# Patient Record
Sex: MALE | NOT HISPANIC OR LATINO | Employment: FULL TIME | URBAN - METROPOLITAN AREA
[De-identification: names, ages, dates, MRNs, and addresses within clinical notes are randomized per-mention and may not be internally consistent; named-entity substitution may affect disease eponyms.]

---

## 2021-10-21 ENCOUNTER — OFFICE VISIT (OUTPATIENT)
Dept: PODIATRY | Facility: CLINIC | Age: 51
End: 2021-10-21

## 2021-10-21 VITALS
SYSTOLIC BLOOD PRESSURE: 128 MMHG | OXYGEN SATURATION: 100 % | HEIGHT: 71 IN | WEIGHT: 157 LBS | BODY MASS INDEX: 21.98 KG/M2 | DIASTOLIC BLOOD PRESSURE: 76 MMHG | TEMPERATURE: 97.5 F | HEART RATE: 80 BPM

## 2021-10-21 DIAGNOSIS — M20.11 HALLUX VALGUS OF RIGHT FOOT: ICD-10-CM

## 2021-10-21 DIAGNOSIS — M79.671 FOOT PAIN, RIGHT: Primary | ICD-10-CM

## 2021-10-21 PROCEDURE — 99203 OFFICE O/P NEW LOW 30 MIN: CPT | Performed by: PODIATRIST

## 2021-10-21 RX ORDER — FLUTICASONE PROPIONATE 50 MCG
2 SPRAY, SUSPENSION (ML) NASAL DAILY
COMMUNITY

## 2021-10-21 RX ORDER — GUAIFENESIN 600 MG/1
1200 TABLET, EXTENDED RELEASE ORAL AS NEEDED
COMMUNITY

## 2021-10-21 RX ORDER — PANTOPRAZOLE SODIUM 20 MG/1
20 TABLET, DELAYED RELEASE ORAL AS NEEDED
COMMUNITY

## 2021-10-21 NOTE — PROGRESS NOTES
[facility] - PODIATRY    Today's Date: 10/21/21    Patient Name: Tomi Granger  MRN: [id number]  CSN: [id number]  PCP: Elbert Pierson MD  Referring Provider: Elbert Pierson MD    SUBJECTIVE     Chief Complaint   Patient presents with   • Left Foot - Bunions, Establish Care   • Right Foot - Bunions, Establish Care, Pain     HPI: Tomi Granger, a 51 y.o.male, presents to clinic.    New, Established, New Problem: New    Location: First MPJ    Duration: First noticed these issues in his early 20s    Onset: Insidious    Nature: Sore    Stable, worsening, improving: Stable    Aggravating factors:  Patient relates pain is aggravated by shoe gear and ambulation.      Previous Treatment: Change in shoe gear, change in activities    Patient denies any fevers, chills, nausea, vomiting, shortness of breath, nor any other constitutional signs nor symptoms.    No other pedal complaints at this time.    Recent medical changes:  none    Past Medical History:   Diagnosis Date   • Acid reflux    • Allergies    • Ankle fracture, right    • Bunion    • Foot fracture, right 2011     Past Surgical History:   Procedure Laterality Date   • ANKLE OPEN REDUCTION INTERNAL FIXATION      2011   • FOOT SURGERY      right, 2011   • WISDOM TOOTH EXTRACTION       Family History   Problem Relation Age of Onset   • Heart disease Mother    • Cancer Mother         breast, lymphoma   • Heart disease Father    • Heart disease Maternal Grandmother    • Cancer Maternal Grandmother         colon   • Diabetes Neg Hx    • Stroke Neg Hx      Social History     Socioeconomic History   • Marital status:    Tobacco Use   • Smoking status: Never Smoker   • Smokeless tobacco: Never Used   Vaping Use   • Vaping Use: Never used   Substance and Sexual Activity   • Alcohol use: Never   • Drug use: Never   • Sexual activity: Defer     Allergies   Allergen Reactions   • Aspirin Palpitations     Current Outpatient Medications    Medication Sig Dispense Refill   • fluticasone (FLONASE) 50 MCG/ACT nasal spray 2 sprays into the nostril(s) as directed by provider Daily.     • guaiFENesin (MUCINEX) 600 MG 12 hr tablet Take 1,200 mg by mouth As Needed for Cough.     • pantoprazole (PROTONIX) 20 MG EC tablet Take 20 mg by mouth As Needed for Heartburn.       No current facility-administered medications for this visit.     Review of Systems   Constitutional: Negative.    Musculoskeletal:        Right 1st MPJ   All other systems reviewed and are negative.      OBJECTIVE     Vitals:    10/21/21 0811   BP: 128/76   Pulse: 80   Temp: 97.5 °F (36.4 °C)   SpO2: 100%       Patient seen in no apparent distress.      PHYSICAL EXAM:     Foot/Ankle Exam:       General:   Appearance: appears stated age and healthy    Orientation: AAOx3    Affect: appropriate    Gait: unimpaired    Shoe Gear:  Casual shoes    VASCULAR      Right Foot Vascularity   Normal vascular exam    Dorsalis pedis:  2+  Posterior tibial:  2+  Skin Temperature: warm    Edema Grading:  None  CFT:  < 3 seconds  Pedal Hair Growth:  Present  Varicosities: none       Left Foot Vascularity   Normal vascular exam    Dorsalis pedis:  2+  Posterior tibial:  2+  Skin Temperature: warm    Edema Grading:  None  CFT:  < 3 seconds  Pedal Hair Growth:  Present  Varicosities: none        NEUROLOGIC     Right Foot Neurologic   Normal sensation    Light touch sensation:  Normal  Vibratory sensation:  Normal  Hot/Cold sensation: normal    Protective Sensation using [place] Monofilament:  10     Left Foot Neurologic   Normal sensation    Light touch sensation:  Normal  Vibratory sensation:  Normal  Hot/cold sensation: normal    Protective Sensation using [place] Monofilament:  10     MUSCULOSKELETAL      Right Foot Musculoskeletal   Hallux valgus: Yes       MUSCLE STRENGTH     Right Foot Muscle Strength   Normal strength    Foot dorsiflexion:  5  Foot plantar flexion:  5  Foot inversion:   5  Foot eversion:  5     Left Foot Muscle Strength   Foot dorsiflexion:  5  Foot plantar flexion:  5  Foot inversion:  5  Foot eversion:  5     RANGE OF MOTION      Right Foot Range of Motion   Foot and ankle ROM within normal limits       Left Foot Range of Motion   Foot and ankle ROM within normal limits       DERMATOLOGIC     Right Foot Dermatologic   Skin: skin intact    Nails: normal       Left Foot Dermatologic   Skin: skin intact    Nails: normal        RADIOLOGY:      IN-OFFICE IMAGING:      3 view, AP, MO, Lateral, right first MPJ pain  Indication: Bunion pain  Findings: Medial deviation of the first metatarsal with associated lateral deviation of the hallux at the metatarsal phalangeal joint.  No periosteal reactions nor osteolytic changes seen.  No occult fractures seen.  Comparison: No comparison views available      ASSESSMENT/PLAN     Diagnoses and all orders for this visit:    1. Foot pain, right (Primary)    2. Hallux valgus of right foot    Comprehensive lower extremity examination and evaluation was performed.    Discussed findings and treatment plan including risks, benefits, and treatment options with patient in detail. Patient agreed with treatment plan.    Planned surgery: Distal right first MPJ bunionectomy.  Upon discussion of surgical correction, post-operative requirements along with risk and benefits of the surgery, the patient states they do not want to pursue surgery at this time.      Dr. Liu recommended purchase and use of OTC Spenco Orthotics.  The patient states understanding and agreement with this plan.    Discussed proper shoegear for the patient's feet and medical condition.  Patient states understanding and agreement with this plan.    An After Visit Summary was printed and given to the patient at discharge, including (if requested) any available informative/educational handouts regarding diagnosis, treatment, or medications. All questions were answered to patient/family  satisfaction. Should symptoms fail to improve or worsen they agree to call or return to clinic or to go to the Emergency Department. Discussed the importance of following up with any needed screening tests/labs/specialist appointments and any requested follow-up recommended by me today. Importance of maintaining follow-up discussed and patient accepts that missed appointments can delay diagnosis and potentially lead to worsening of conditions.    Return if symptoms worsen or fail to improve., or sooner if acute issues arise.    This document has been electronically signed by Farzad Liu DPM on October 21, 2021 08:58 EDT

## 2021-11-15 ENCOUNTER — TELEPHONE (OUTPATIENT)
Dept: GASTROENTEROLOGY | Facility: CLINIC | Age: 51
End: 2021-11-15

## 2021-11-15 NOTE — TELEPHONE ENCOUNTER
CALLED PT 4 TIMES TO DO PHONE SCREENING CALL. PT DOESN'T HAVE A VM SET UP ON THE PHONE COULD NOT LEAVE HIM A MESSAGE. CALLED 2 TIMES AROUND APPOINTMENT TIME AND 3 TIME THIS AFTERNOON. WILL NO SHOW THE PT FOR KEVIN. AND WAIT FOR A CALL BACK.

## 2023-03-10 ENCOUNTER — PREP FOR SURGERY (OUTPATIENT)
Dept: OTHER | Facility: HOSPITAL | Age: 53
End: 2023-03-10
Payer: COMMERCIAL

## 2023-03-10 ENCOUNTER — CLINICAL SUPPORT (OUTPATIENT)
Dept: GASTROENTEROLOGY | Facility: CLINIC | Age: 53
End: 2023-03-10
Payer: COMMERCIAL

## 2023-03-10 DIAGNOSIS — Z80.0 FAMILY HISTORY OF COLON CANCER: ICD-10-CM

## 2023-03-10 DIAGNOSIS — Z12.11 COLON CANCER SCREENING: Primary | ICD-10-CM

## 2023-03-10 DIAGNOSIS — K21.9 GASTROESOPHAGEAL REFLUX DISEASE, UNSPECIFIED WHETHER ESOPHAGITIS PRESENT: ICD-10-CM

## 2023-03-10 RX ORDER — SODIUM PICOSULFATE, MAGNESIUM OXIDE, AND ANHYDROUS CITRIC ACID 10; 3.5; 12 MG/160ML; G/160ML; G/160ML
160 LIQUID ORAL TAKE AS DIRECTED
Qty: 320 ML | Refills: 0 | Status: SHIPPED | OUTPATIENT
Start: 2023-03-10

## 2023-03-10 NOTE — PROGRESS NOTES
Tomi Granger  1970  52 y.o.    Reason for call: Screening Colonoscopy and GERD  Prep prescribed: Clenpiq  Prep instructions reviewed with patient and sent to patient via [facility]  Clearance needed? No  If yes, what clearance is needed? N/A  Clearance has been requested from N/A  The patient has been scheduled for: EGD & Colonoscopy  Family history of colon cancer? Yes  If yes, indicate relative: Maternal Grandmother  Family History   Problem Relation Age of Onset   • Heart disease Mother    • Cancer Mother         breast, lymphoma   • Heart disease Father    • Colon cancer Maternal Grandmother    • Heart disease Maternal Grandmother    • Cancer Maternal Grandmother         colon   • Diabetes Neg Hx    • Stroke Neg Hx      Past Medical History:   Diagnosis Date   • Acid reflux    • Allergies    • Ankle fracture, right    • Bunion    • Foot fracture, right 2011     Allergies   Allergen Reactions   • Aspirin Palpitations     Past Surgical History:   Procedure Laterality Date   • ANKLE OPEN REDUCTION INTERNAL FIXATION      2011   • FOOT SURGERY      right, 2011   • UPPER GASTROINTESTINAL ENDOSCOPY     • WISDOM TOOTH EXTRACTION       Social History     Socioeconomic History   • Marital status:    Tobacco Use   • Smoking status: Never     Passive exposure: Never   • Smokeless tobacco: Never   Vaping Use   • Vaping Use: Never used   Substance and Sexual Activity   • Alcohol use: Never   • Drug use: Never   • Sexual activity: Defer       Current Outpatient Medications:   •  fluticasone (FLONASE) 50 MCG/ACT nasal spray, 2 sprays into the nostril(s) as directed by provider Daily., Disp: , Rfl:   •  guaiFENesin (MUCINEX) 600 MG 12 hr tablet, Take 2 tablets by mouth As Needed for Cough., Disp: , Rfl:   •  pantoprazole (PROTONIX) 20 MG EC tablet, Take 1 tablet by mouth As Needed for Heartburn., Disp: , Rfl:   Answers for HPI/ROS submitted by the patient on 3/5/2023  What is the primary reason for your visit?:  Other  Please describe your symptoms.: Primary doctor wants me to have a colonoscopy. , , I want to have my stomach scanned as well because I have similar symptoms of reguritating gastric juices when eating some foods. My primary doctor gave me meds but I had the entry of my stomach expanded before and I suspect it needs it again because my symptoms are similar.  Have you had these symptoms before?: Yes  How long have you been having these symptoms?: Greater than 2 weeks  Please list any medications you are currently taking for this condition.: Antacids, Pantoprazole. Seldom take.  Please describe any probable cause for these symptoms. : Stated above

## 2023-09-18 ENCOUNTER — OFFICE VISIT (OUTPATIENT)
Dept: GASTROENTEROLOGY | Facility: CLINIC | Age: 53
End: 2023-09-18
Payer: COMMERCIAL

## 2023-09-18 VITALS
HEIGHT: 71 IN | SYSTOLIC BLOOD PRESSURE: 124 MMHG | BODY MASS INDEX: 21.42 KG/M2 | HEART RATE: 68 BPM | WEIGHT: 153 LBS | DIASTOLIC BLOOD PRESSURE: 73 MMHG | OXYGEN SATURATION: 100 %

## 2023-09-18 DIAGNOSIS — Z80.0 FAMILY HISTORY OF COLON CANCER: Primary | ICD-10-CM

## 2023-09-18 DIAGNOSIS — Z86.010 PERSONAL HISTORY OF COLONIC POLYPS: ICD-10-CM

## 2023-09-18 DIAGNOSIS — K21.9 GASTROESOPHAGEAL REFLUX DISEASE, UNSPECIFIED WHETHER ESOPHAGITIS PRESENT: ICD-10-CM

## 2023-09-18 DIAGNOSIS — K20.0 EOSINOPHILIC ESOPHAGITIS: ICD-10-CM

## 2023-09-18 PROCEDURE — 99214 OFFICE O/P EST MOD 30 MIN: CPT | Performed by: NURSE PRACTITIONER

## 2023-09-18 NOTE — PROGRESS NOTES
Chief Complaint   2m endo follow up    History of Present Illness       Tomi Granger is a 52 y.o. male who presents to [facility] for follow-up after recent EGD and colonoscopy.  We have reviewed endoscopy in office.  On patient's recent EGD he had esophageal dilation which has improved his dysphagia.  He reports previous esophageal dilation about 20 years ago.  Patient's biopsies consistent with eosinophilic esophagitis.  Patient continues on Protonix and has seen relief in his reflux as well as his dysphagia.  Patient reports normal bowel movements.  Patient has cut out soy, wheat, eggs and dairy products to see if this improves his EOE.  Patient also struggles with allergies and dry skin.  Patient denies fever, nausea, vomiting, weight loss, night sweats, melena, hematochezia, hematemesis.    Endoscopy: Review of the patient's most recent EGD and colonoscopy performed by Dr. Watt on 07.17.2023 10 mm polyp in the descending colon, 8 mm polyp in the sigmoid colon tubular adenoma repeat 3 years.  EGD with eosinophilic esophagitis dilated to 18 mm.  Continue Protonix.      Results       Result Review :   The following data was reviewed by: INES Ying on 09/18/2023:          Iron Profile No results found for: IRON, TIBC, LABIRON, TRANSFERRIN  Ferritin No results found for: FERRITIN            Past Medical History       Past Medical History:   Diagnosis Date    Acid reflux     Allergies     Ankle fracture, right     Bunion     Foot fracture, right 2011       Past Surgical History:   Procedure Laterality Date    ANKLE OPEN REDUCTION INTERNAL FIXATION Right     2011    COLONOSCOPY N/A 07/17/2023    Procedure: COLONOSCOPY WITH HOT SNARE POLYPECTOMIES, CLIP APPLICATION X1;  Surgeon: Isaiah Watt MD;  Location: [facility] ENDOSCOPY;  Service: Gastroenterology;  Laterality: N/A;  COLON POLYPS    ENDOSCOPY N/A 07/17/2023    Procedure: ESOPHAGOGASTRODUODENOSCOPY WITH  "BIOPSIES AND 15-18MM BALLOON DILATATION;  Surgeon: Isaiah Watt MD;  Location: [facility] ENDOSCOPY;  Service: Gastroenterology;  Laterality: N/A;  ESOPHAGEAL STRICTURE    FOOT SURGERY      right, 2011    UPPER GASTROINTESTINAL ENDOSCOPY      WISDOM TOOTH EXTRACTION           Current Outpatient Medications:     fluticasone (FLONASE) 50 MCG/ACT nasal spray, 2 sprays into the nostril(s) as directed by provider Daily., Disp: , Rfl:     pantoprazole (PROTONIX) 40 MG EC tablet, Take 1 tablet by mouth Daily., Disp: 30 tablet, Rfl: 5    pantoprazole (PROTONIX) 20 MG EC tablet, Take 1 tablet by mouth As Needed for Heartburn. (Patient not taking: Reported on 9/18/2023), Disp: , Rfl:      Allergies   Allergen Reactions    Aspirin Palpitations       Family History   Problem Relation Age of Onset    Heart disease Mother     Cancer Mother         breast, lymphoma    Heart disease Father     Colon cancer Maternal Grandmother     Heart disease Maternal Grandmother     Cancer Maternal Grandmother         colon    Diabetes Neg Hx     Stroke Neg Hx         Social History     Social History Narrative    Not on file       Objective     Vital Signs:   /73 (BP Location: Left arm, Patient Position: Sitting, Cuff Size: Small Adult)   Pulse 68   Ht 180.3 cm (71\")   Wt 69.4 kg (153 lb)   SpO2 100%   BMI 21.34 kg/m²       Physical Exam  Constitutional:       General: He is not in acute distress.     Appearance: Normal appearance. He is well-developed and normal weight.   Eyes:      Conjunctiva/sclera: Conjunctivae normal.      Pupils: Pupils are equal, round, and reactive to light.      Visual Fields: Right eye visual fields normal and left eye visual fields normal.   Cardiovascular:      Rate and Rhythm: Normal rate and regular rhythm.      Heart sounds: Normal heart sounds.   Pulmonary:      Effort: Pulmonary effort is normal. No retractions.      Breath sounds: Normal breath sounds and air entry.      Comments: Inspection " of chest: normal appearance  Abdominal:      General: Bowel sounds are normal.      Palpations: Abdomen is soft.      Tenderness: There is no abdominal tenderness.      Comments: No appreciable hepatosplenomegaly   Musculoskeletal:      Cervical back: Neck supple.      Right lower leg: No edema.      Left lower leg: No edema.   Lymphadenopathy:      Cervical: No cervical adenopathy.   Skin:     Findings: No lesion.      Comments: Turgor normal   Neurological:      Mental Status: He is alert and oriented to person, place, and time.   Psychiatric:         Mood and Affect: Mood and affect normal.         Assessment & Plan          Assessment and Plan    Diagnoses and all orders for this visit:    1. Family history of colon cancer (Primary)    2. Gastroesophageal reflux disease, unspecified whether esophagitis present    3. Eosinophilic esophagitis    4. Personal history of colonic polyps      52-year-old male presenting the office today for follow-up after recent EGD and colonoscopy.  We have reviewed endoscopy in office.  On patient's recent EGD he had esophageal dilation which has improved his dysphagia.  Patient will continue Protonix due to EOE.  We have discussed Dupixent as a possible treatment option.  Patient be due for repeat colonoscopy in 3 years, 2026.  Patient agreeable to this plan will follow-up annually.          Follow Up       Follow Up   Return in about 1 year (around 9/18/2024).  Patient was given instructions and counseling regarding his condition or for health maintenance advice. Please see specific information pulled into the AVS if appropriate.

## 2024-01-17 ENCOUNTER — OFFICE VISIT (OUTPATIENT)
Dept: PODIATRY | Facility: CLINIC | Age: 54
End: 2024-01-17
Payer: COMMERCIAL

## 2024-01-17 VITALS
WEIGHT: 162 LBS | SYSTOLIC BLOOD PRESSURE: 111 MMHG | TEMPERATURE: 98.1 F | OXYGEN SATURATION: 99 % | HEART RATE: 83 BPM | DIASTOLIC BLOOD PRESSURE: 72 MMHG | BODY MASS INDEX: 22.59 KG/M2

## 2024-01-17 DIAGNOSIS — M20.42 HAMMER TOE OF LEFT FOOT: Primary | ICD-10-CM

## 2024-01-17 DIAGNOSIS — M20.41 HAMMER TOE OF RIGHT FOOT: ICD-10-CM

## 2024-01-17 DIAGNOSIS — M79.671 FOOT PAIN, RIGHT: ICD-10-CM

## 2024-01-17 DIAGNOSIS — M79.672 FOOT PAIN, LEFT: ICD-10-CM

## 2024-01-17 NOTE — PROGRESS NOTES
Answers submitted by the patient for this visit:  Primary Reason for Visit (Submitted on 1/10/2024)  What is the primary reason for your visit?: Other  Other (Submitted on 1/10/2024)  Please describe your symptoms.: Left pinky toe pain and Right 5th toe  Have you had these symptoms before?: No  How long have you been having these symptoms?: Greater than 2 weeks  Please list any medications you are currently taking for this condition.: None  Please describe any probable cause for these symptoms. : Most likely by not wearing shoes with a wider toe box.      [facility] - PODIATRY    Today's Date: 01/17/24    Patient Name: Tomi Granger  MRN: [id number]  CSN: [id number]  PCP: Elbert Peirson MD  Referring Provider: No ref. provider found    SUBJECTIVE     Chief Complaint   Patient presents with    Left Foot - Follow-up, Pain, Bunions     He was walking in December and after walking he noticed his 5th toe was curled under. Also has a callouses    Right Foot - Follow-up, Bunions, Pain     Prior surgery on right foot     HPI: Tomi Granger, a 53 y.o.male, presents to clinic.    New, Established, New Problem: New problem    Location: Fifth toe bilaterally, left worse than right    Onset: Insidious    Nature: Sore, painful    Aggravating factors:  Patient relates pain is aggravated by shoe gear and ambulation.      Previous Treatment: Change in shoes and activities.    Patient denies any fevers, chills, nausea, vomiting, shortness of breath, nor any other constitutional signs nor symptoms.    No other pedal complaints at this time.    Past Medical History:   Diagnosis Date    Acid reflux     Allergies     Ankle fracture, right     Bunion     Foot fracture, right 2011     Past Surgical History:   Procedure Laterality Date    ANKLE OPEN REDUCTION INTERNAL FIXATION Right     2011    COLONOSCOPY N/A 07/17/2023    Procedure: COLONOSCOPY WITH HOT SNARE POLYPECTOMIES, CLIP APPLICATION X1;  Surgeon: Shukri  "Isaiah Willis MD;  Location: [facility] ENDOSCOPY;  Service: Gastroenterology;  Laterality: N/A;  COLON POLYPS    ENDOSCOPY N/A 07/17/2023    Procedure: ESOPHAGOGASTRODUODENOSCOPY WITH BIOPSIES AND 15-18MM BALLOON DILATATION;  Surgeon: Isaiah Watt MD;  Location: [facility] ENDOSCOPY;  Service: Gastroenterology;  Laterality: N/A;  ESOPHAGEAL STRICTURE    FOOT SURGERY      right, 2011    UPPER GASTROINTESTINAL ENDOSCOPY      WISDOM TOOTH EXTRACTION       Family History   Problem Relation Age of Onset    Heart disease Mother     Cancer Mother         breast, lymphoma    Heart disease Father     Colon cancer Maternal Grandmother     Heart disease Maternal Grandmother     Cancer Maternal Grandmother         colon    Diabetes Neg Hx     Stroke Neg Hx      Social History     Socioeconomic History    Marital status:    Tobacco Use    Smoking status: Never     Passive exposure: Past    Smokeless tobacco: Never   Vaping Use    Vaping Use: Never used   Substance and Sexual Activity    Alcohol use: Never    Drug use: Never    Sexual activity: Defer     Allergies   Allergen Reactions    Aspirin Palpitations     Current Outpatient Medications   Medication Sig Dispense Refill    fluticasone (FLONASE) 50 MCG/ACT nasal spray 2 sprays into the nostril(s) as directed by provider Daily.      pantoprazole (PROTONIX) 40 MG EC tablet Take 1 tablet by mouth Daily. 30 tablet 5     No current facility-administered medications for this visit.     Review of Systems   Constitutional: Negative.    Musculoskeletal:         Fifth toe bilaterally, left worse than right.   All other systems reviewed and are negative.      OBJECTIVE     Vitals:    01/17/24 0731   BP: 111/72   Pulse: 83   Temp: 98.1 °F (36.7 °C)   SpO2: 99%       No results found for: \"WBC\", \"RBC\", \"HGB\", \"HCT\", \"MCV\", \"MCH\", \"MCHC\", \"RDW\", \"RDWSD\", \"MPV\", \"PLT\", \"NEUTRORELPCT\", \"LYMPHORELPCT\", \"MONORELPCT\", \"EOSRELPCT\", \"BASORELPCT\", \"AUTOIGPER\", \"NEUTROABS\", \"LYMPHSABS\", " "\"MONOSABS\", \"EOSABS\", \"BASOSABS\", \"AUTOIGNUM\", \"NRBC\"      No results found for: \"GLUCOSE\", \"BUN\", \"CREATININE\", \"EGFRRESULT\", \"EGFR\", \"BCR\", \"K\", \"CO2\", \"CALCIUM\", \"PROTENTOTREF\", \"ALBUMIN\", \"BILITOT\", \"AST\", \"ALT\"    Patient seen in no apparent distress.      PHYSICAL EXAM:     Foot/Ankle Exam    GENERAL  Appearance:  appears stated age  Orientation:  AAOx3  Affect:  appropriate  Gait:  unimpaired  Assistance:  independent  Right shoe gear: casual shoe  Left shoe gear: casual shoe    VASCULAR     Right Foot Vascularity   Normal vascular exam    Dorsalis pedis:  2+  Posterior tibial:  2+  Skin temperature:  warm  Edema grading:  None  CFT:  < 3 seconds  Pedal hair growth:  Present  Varicosities:  none     Left Foot Vascularity   Normal vascular exam    Dorsalis pedis:  2+  Posterior tibial:  2+  Skin temperature:  warm  Edema grading:  None  CFT:  < 3 seconds  Pedal hair growth:  Present  Varicosities:  none     NEUROLOGIC     Right Foot Neurologic   Normal sensation    Light touch sensation: normal  Vibratory sensation: normal  Hot/Cold sensation: normal  Protective Sensation using [place] Monofilament:   Sites intact: 10  Sites tested: 10     Left Foot Neurologic   Normal sensation    Light touch sensation: normal  Vibratory sensation: normal  Hot/Cold sensation:  normal  Protective Sensation using [place] Monofilament:   Sites intact: 10  Sites tested: 10    MUSCULOSKELETAL     Right Foot Musculoskeletal   Tenderness:  toe 5 tenderness    Hammertoe:  Fifth toe  Hallux valgus: Yes       Left Foot Musculoskeletal   Tenderness:  toe 5 tenderness  Hammertoe:  Fifth toe    MUSCLE STRENGTH     Right Foot Muscle Strength   Foot dorsiflexion:  4  Foot plantar flexion:  4  Foot inversion:  4  Foot eversion:  4     Left Foot Muscle Strength   Foot dorsiflexion:  4  Foot plantar flexion:  4  Foot inversion:  4  Foot eversion:  4    RANGE OF MOTION     Right Foot Range of Motion   Foot and ankle ROM within " normal limits       Left Foot Range of Motion   Foot and ankle ROM within normal limits      DERMATOLOGIC      Right Foot Dermatologic   Skin  Right foot skin is intact.      Left Foot Dermatologic   Skin  Left foot skin is intact.       RADIOLOGY:      XR Foot 3+ View Right    Result Date: 1/17/2024  Narrative: IN-OFFICE IMAGING:  Standing, weightbearing, 3 view, AP, MO, Lateral, Right foot Indication:  Foot Pain Findings: Adductovarus contracted fifth digit.  Anterior cyma line position seen on lateral view.  No periosteal reactions nor osteolytic changes seen.  No occult fractures seen.  Fifth metatarsal and fibula are in good position with no osteolytic changes seen around hardware.  Hardware intact. Comparison: No comparison views available.  Medial deviation of the first metatarsal with associated lateral deviation of the hallux at the metatarsal phalangeal joint.    XR Foot 3+ View Left    Result Date: 1/17/2024  Narrative: IN-OFFICE IMAGING:  Standing, weightbearing, 3 view, AP, MO, Lateral, Left foot Indication:  Foot Pain Findings: Adductovarus contracted fifth digit.  Anterior cyma line position seen on lateral view.  No periosteal reactions nor osteolytic changes seen.  No occult fractures seen. Comparison: No comparison views available.     ASSESSMENT/PLAN     Diagnoses and all orders for this visit:    1. Hammer toe of left foot (Primary)    2. Hammer toe of right foot    3. Foot pain, right    4. Foot pain, left        Comprehensive lower extremity examination and evaluation was performed.    Discussed findings and treatment plan including risks, benefits, and treatment options with patient in detail. Patient agreed with treatment plan.    Medications and allergies reviewed.  Reviewed available lab values along with other pertinent labs.  These were discussed with the patient.    Recommended surgery: Left fifth toe arthroplasty with K wire.  Upon discussion of surgical correction, post-operative  requirements along with risk and benefits of the surgery, the patient states they do not want to pursue surgery at this time.      Padding dispensed to off-weight pressure area.  Patient was instructed on proper use of the padding.  They state understanding and agreement with using the dispensed padding.    Discussed proper shoegear for the patient's feet and medical condition.  Patient states understanding and agreement with this plan.    An After Visit Summary was printed and given to the patient at discharge, including (if requested) any available informative/educational handouts regarding diagnosis, treatment, or medications. All questions were answered to patient/family satisfaction. Should symptoms fail to improve or worsen they agree to call or return to clinic or to go to the Emergency Department. Discussed the importance of following up with any needed screening tests/labs/specialist appointments and any requested follow-up recommended by me today. Importance of maintaining follow-up discussed and patient accepts that missed appointments can delay diagnosis and potentially lead to worsening of conditions.    No follow-ups on file., or sooner if acute issues arise.    This document has been electronically signed by Farzad Liu DPM on January 17, 2024 08:21 EST

## 2024-01-23 RX ORDER — PANTOPRAZOLE SODIUM 40 MG/1
40 TABLET, DELAYED RELEASE ORAL DAILY
Qty: 30 TABLET | Refills: 5 | Status: SHIPPED | OUTPATIENT
Start: 2024-01-23

## 2024-09-23 ENCOUNTER — OFFICE VISIT (OUTPATIENT)
Dept: GASTROENTEROLOGY | Facility: CLINIC | Age: 54
End: 2024-09-23
Payer: COMMERCIAL

## 2024-09-23 VITALS
BODY MASS INDEX: 20.98 KG/M2 | HEIGHT: 71 IN | OXYGEN SATURATION: 100 % | WEIGHT: 149.9 LBS | SYSTOLIC BLOOD PRESSURE: 119 MMHG | DIASTOLIC BLOOD PRESSURE: 53 MMHG | HEART RATE: 77 BPM

## 2024-09-23 DIAGNOSIS — Z86.0100 PERSONAL HISTORY OF COLONIC POLYPS: ICD-10-CM

## 2024-09-23 DIAGNOSIS — K20.0 EOSINOPHILIC ESOPHAGITIS: ICD-10-CM

## 2024-09-23 DIAGNOSIS — Z91.018 MULTIPLE FOOD ALLERGIES: ICD-10-CM

## 2024-09-23 DIAGNOSIS — Z80.0 FAMILY HISTORY OF COLON CANCER: ICD-10-CM

## 2024-09-23 DIAGNOSIS — K21.00 GASTROESOPHAGEAL REFLUX DISEASE WITH ESOPHAGITIS WITHOUT HEMORRHAGE: Primary | ICD-10-CM

## 2024-09-23 PROCEDURE — 99214 OFFICE O/P EST MOD 30 MIN: CPT | Performed by: NURSE PRACTITIONER

## 2024-09-23 RX ORDER — PANTOPRAZOLE SODIUM 40 MG/1
40 TABLET, DELAYED RELEASE ORAL DAILY
Qty: 30 TABLET | Refills: 5 | Status: SHIPPED | OUTPATIENT
Start: 2024-09-23

## 2025-07-09 ENCOUNTER — TELEPHONE (OUTPATIENT)
Dept: GASTROENTEROLOGY | Facility: CLINIC | Age: 55
End: 2025-07-09
Payer: COMMERCIAL

## 2025-07-09 NOTE — TELEPHONE ENCOUNTER
"        Hub staff attempted to follow warm transfer process and was unsuccessful     Caller: Tomi Granger \"Constantine\"    Relationship to patient: Self    Best call back number: [phone]    Patient is needing: PT CALLING TO RESCHEDULE [facility] APPT. HUB UNABLE TO TRANSFER CALL TO SEE IF APPTS ARE SET ASIDE. FIRST AVAILABLE IS DEC/JAN FOR DR WILSON AND/OR LUIS ANGEL SPARROW. PLEASE CONTACT PT TO ASSIST W/RESCHEDULE.         "

## 2025-07-14 NOTE — TELEPHONE ENCOUNTER
Returned call to patientin regard to 9/22/25 appt w/ INES Ying.  Left message on VM with my Direct line.  We currently do not have a schedule for our new provider-Patient to be placed on recall list for scheduling.